# Patient Record
Sex: MALE | Race: WHITE | NOT HISPANIC OR LATINO | Employment: FULL TIME | URBAN - METROPOLITAN AREA
[De-identification: names, ages, dates, MRNs, and addresses within clinical notes are randomized per-mention and may not be internally consistent; named-entity substitution may affect disease eponyms.]

---
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
## 2025-01-08 ENCOUNTER — OFFICE VISIT (OUTPATIENT)
Dept: ORTHOPEDIC SURGERY | Facility: CLINIC | Age: 20
End: 2025-01-08
Payer: COMMERCIAL

## 2025-01-08 DIAGNOSIS — G56.01 CARPAL TUNNEL SYNDROME ON RIGHT: ICD-10-CM

## 2025-01-08 DIAGNOSIS — M79.641 RIGHT HAND PAIN: Primary | ICD-10-CM

## 2025-01-08 PROCEDURE — 99213 OFFICE O/P EST LOW 20 MIN: CPT | Performed by: ORTHOPAEDIC SURGERY

## 2025-01-08 PROCEDURE — 1036F TOBACCO NON-USER: CPT | Performed by: ORTHOPAEDIC SURGERY

## 2025-01-08 PROCEDURE — 99213 OFFICE O/P EST LOW 20 MIN: CPT | Mod: 25 | Performed by: ORTHOPAEDIC SURGERY

## 2025-01-08 PROCEDURE — 2500000004 HC RX 250 GENERAL PHARMACY W/ HCPCS (ALT 636 FOR OP/ED): Performed by: ORTHOPAEDIC SURGERY

## 2025-01-08 PROCEDURE — 20526 THER INJECTION CARP TUNNEL: CPT | Mod: RT | Performed by: ORTHOPAEDIC SURGERY

## 2025-01-08 RX ORDER — LIDOCAINE HYDROCHLORIDE 20 MG/ML
1 INJECTION, SOLUTION INFILTRATION; PERINEURAL
Status: COMPLETED | OUTPATIENT
Start: 2025-01-08 | End: 2025-01-08

## 2025-01-08 RX ORDER — TRIAMCINOLONE ACETONIDE 40 MG/ML
40 INJECTION, SUSPENSION INTRA-ARTICULAR; INTRAMUSCULAR
Status: COMPLETED | OUTPATIENT
Start: 2025-01-08 | End: 2025-01-08

## 2025-01-08 RX ADMIN — LIDOCAINE HYDROCHLORIDE 1 ML: 20 INJECTION, SOLUTION INFILTRATION; PERINEURAL at 14:53

## 2025-01-08 RX ADMIN — TRIAMCINOLONE ACETONIDE 40 MG: 400 INJECTION, SUSPENSION INTRA-ARTICULAR; INTRAMUSCULAR at 14:53

## 2025-01-13 ENCOUNTER — APPOINTMENT (OUTPATIENT)
Dept: PRIMARY CARE | Facility: CLINIC | Age: 20
End: 2025-01-13
Payer: COMMERCIAL

## 2025-02-12 ENCOUNTER — APPOINTMENT (OUTPATIENT)
Dept: PRIMARY CARE | Facility: CLINIC | Age: 20
End: 2025-02-12
Payer: COMMERCIAL

## 2025-04-29 ENCOUNTER — APPOINTMENT (OUTPATIENT)
Dept: PRIMARY CARE | Facility: CLINIC | Age: 20
End: 2025-04-29
Payer: COMMERCIAL

## 2025-04-29 VITALS
HEIGHT: 72 IN | DIASTOLIC BLOOD PRESSURE: 84 MMHG | WEIGHT: 315 LBS | SYSTOLIC BLOOD PRESSURE: 130 MMHG | BODY MASS INDEX: 42.66 KG/M2

## 2025-04-29 DIAGNOSIS — F32.A DEPRESSION, UNSPECIFIED DEPRESSION TYPE: ICD-10-CM

## 2025-04-29 DIAGNOSIS — Z00.00 ENCOUNTER FOR PREVENTIVE HEALTH EXAMINATION: ICD-10-CM

## 2025-04-29 DIAGNOSIS — Z00.00 HEALTH CARE MAINTENANCE: ICD-10-CM

## 2025-04-29 DIAGNOSIS — R29.818 SUSPECTED SLEEP APNEA: ICD-10-CM

## 2025-04-29 DIAGNOSIS — E66.01 SEVERE OBESITY (BMI >= 40) (MULTI): Primary | ICD-10-CM

## 2025-04-29 PROCEDURE — 1036F TOBACCO NON-USER: CPT | Performed by: STUDENT IN AN ORGANIZED HEALTH CARE EDUCATION/TRAINING PROGRAM

## 2025-04-29 PROCEDURE — 3008F BODY MASS INDEX DOCD: CPT | Performed by: STUDENT IN AN ORGANIZED HEALTH CARE EDUCATION/TRAINING PROGRAM

## 2025-04-29 PROCEDURE — 99395 PREV VISIT EST AGE 18-39: CPT | Performed by: STUDENT IN AN ORGANIZED HEALTH CARE EDUCATION/TRAINING PROGRAM

## 2025-04-29 RX ORDER — DESVENLAFAXINE 50 MG/1
1 TABLET, FILM COATED, EXTENDED RELEASE ORAL
COMMUNITY
Start: 2024-12-10

## 2025-04-29 ASSESSMENT — COLUMBIA-SUICIDE SEVERITY RATING SCALE - C-SSRS
2. HAVE YOU ACTUALLY HAD ANY THOUGHTS OF KILLING YOURSELF?: NO
1. IN THE PAST MONTH, HAVE YOU WISHED YOU WERE DEAD OR WISHED YOU COULD GO TO SLEEP AND NOT WAKE UP?: NO

## 2025-04-29 ASSESSMENT — PROMIS GLOBAL HEALTH SCALE
RATE_SOCIAL_SATISFACTION: VERY GOOD
RATE_GENERAL_HEALTH: GOOD
CARRYOUT_PHYSICAL_ACTIVITIES: COMPLETELY
RATE_MENTAL_HEALTH: FAIR
CARRYOUT_SOCIAL_ACTIVITIES: VERY GOOD
EMOTIONAL_PROBLEMS: SOMETIMES
RATE_QUALITY_OF_LIFE: VERY GOOD
RATE_PHYSICAL_HEALTH: FAIR
RATE_AVERAGE_PAIN: 5

## 2025-04-29 ASSESSMENT — PATIENT HEALTH QUESTIONNAIRE - PHQ9: 1. LITTLE INTEREST OR PLEASURE IN DOING THINGS: NOT AT ALL

## 2025-04-29 NOTE — PROGRESS NOTES
Subjective   Patient ID: Raphael Britt is a 20 y.o. male who presents for Annual Exam (TROUBLE SLEEPING /WEIGHT ISSUES).    HPI   Yearly physical.    He wants to try to lose weight. He is working out a bit more at home.    He says he does not eat as much as he used to.    He is seeing psychiatry for depression.  Review of Systems  12-point ROS reviewed and was negative unless otherwise noted in HPI.    Objective   /84   Ht 1.829 m (6')   Wt 148 kg (327 lb)   BMI 44.35 kg/m²     Physical Exam  GEN: conversant, NAD  HEENT: PERRL, EOMI, MMM  NECK: supple, no carotid bruits appreciated b/l  CV: S1, S2, RRR  PULM: CTAB  ABD: soft, NT, ND  NEURO: no new gross focal deficits  EXT: no sig LE edema  PSYCH: appropriate affect    Assessment/Plan     #Obesity, severe, class 2: advised increased efforts at healthy diet, exercise, weight loss. Referral to nutritionist. Referral to sleep medicine for ALBINA eval.    #Depression: seeing psychiatry, taking desvenlafaxine.     #Health Maintenance: advised age-appropriate vaccines and yearly flu shot. Routine labs.     RTC 6-12 year

## 2025-05-05 ENCOUNTER — OFFICE VISIT (OUTPATIENT)
Dept: ORTHOPEDIC SURGERY | Facility: CLINIC | Age: 20
End: 2025-05-05
Payer: COMMERCIAL

## 2025-05-05 DIAGNOSIS — M79.641 RIGHT HAND PAIN: Primary | ICD-10-CM

## 2025-05-05 DIAGNOSIS — G56.01 CARPAL TUNNEL SYNDROME ON RIGHT: ICD-10-CM

## 2025-05-05 PROCEDURE — 99213 OFFICE O/P EST LOW 20 MIN: CPT | Performed by: ORTHOPAEDIC SURGERY

## 2025-05-05 PROCEDURE — 20526 THER INJECTION CARP TUNNEL: CPT | Mod: RT | Performed by: ORTHOPAEDIC SURGERY

## 2025-05-05 PROCEDURE — 1036F TOBACCO NON-USER: CPT | Performed by: ORTHOPAEDIC SURGERY

## 2025-05-05 PROCEDURE — 99213 OFFICE O/P EST LOW 20 MIN: CPT | Mod: 25 | Performed by: ORTHOPAEDIC SURGERY

## 2025-05-05 PROCEDURE — 2500000004 HC RX 250 GENERAL PHARMACY W/ HCPCS (ALT 636 FOR OP/ED): Performed by: ORTHOPAEDIC SURGERY

## 2025-05-05 RX ORDER — TRIAMCINOLONE ACETONIDE 40 MG/ML
40 INJECTION, SUSPENSION INTRA-ARTICULAR; INTRAMUSCULAR
Status: COMPLETED | OUTPATIENT
Start: 2025-05-05 | End: 2025-05-05

## 2025-05-05 RX ORDER — LIDOCAINE HYDROCHLORIDE 20 MG/ML
1 INJECTION, SOLUTION INFILTRATION; PERINEURAL
Status: COMPLETED | OUTPATIENT
Start: 2025-05-05 | End: 2025-05-05

## 2025-05-05 RX ADMIN — TRIAMCINOLONE ACETONIDE 40 MG: 400 INJECTION, SUSPENSION INTRA-ARTICULAR; INTRAMUSCULAR at 15:31

## 2025-05-05 RX ADMIN — LIDOCAINE HYDROCHLORIDE 1 ML: 20 INJECTION, SOLUTION INFILTRATION; PERINEURAL at 15:31

## 2025-05-05 NOTE — PROGRESS NOTES
Subjective    Patient ID: Raphael Britt is a 20 y.o. male.    Chief Complaint: Follow-up of the Right Hand     Last Surgery: No surgery found  Last Surgery Date: No surgery found    HPI  Patient comes in for follow-up of his right carpal tunnel syndrome.  He has an EMG that shows mild carpal tunnel syndrome.  He underwent an injection about 4 months ago.  However his symptoms have returned and are at worst in the morning.    Objective   Ortho Exam  Patient is in no acute distress.  Exam of his right hand and wrist reveals there is no thenar intrinsic atrophy.  He has a positive Tinel's test, a positive carpal tunnel compression test and a positive Phalen test.    Assessment/Plan   Encounter Diagnoses:  Right hand pain    Carpal tunnel syndrome on right    Patient has known right carpal tunnel syndrome.  It has recurred following a Kenalog injection.  He would like to undergo another injection as opposed to surgery today.    Hand / UE Inj/Asp: R carpal tunnel for carpal tunnel syndrome on 5/5/2025 3:31 PM  Indications: pain  Details: 25 G needle, volar approach  Medications: 40 mg triamcinolone acetonide 40 mg/mL; 1 mL lidocaine 20 mg/mL (2 %)  Outcome: tolerated well, no immediate complications  Procedure, treatment alternatives, risks and benefits explained, specific risks discussed. Consent was given by the patient. Immediately prior to procedure a time out was called to verify the correct patient, procedure, equipment, support staff and site/side marked as required. Patient was prepped and draped in the usual sterile fashion.         Patient was informed to take spot a week for the injection have an effect.  However he was also informed that if the symptoms do return his next step in treatment would be surgery.  He will follow-up as his symptoms dictate.

## 2025-10-28 ENCOUNTER — APPOINTMENT (OUTPATIENT)
Dept: PRIMARY CARE | Facility: CLINIC | Age: 20
End: 2025-10-28
Payer: COMMERCIAL